# Patient Record
Sex: MALE | Race: WHITE
[De-identification: names, ages, dates, MRNs, and addresses within clinical notes are randomized per-mention and may not be internally consistent; named-entity substitution may affect disease eponyms.]

---

## 2019-11-11 ENCOUNTER — HOSPITAL ENCOUNTER (EMERGENCY)
Dept: HOSPITAL 88 - ER | Age: 34
Discharge: HOME | End: 2019-11-11
Payer: SELF-PAY

## 2019-11-11 VITALS — DIASTOLIC BLOOD PRESSURE: 94 MMHG | SYSTOLIC BLOOD PRESSURE: 113 MMHG

## 2019-11-11 VITALS — HEIGHT: 72 IN | BODY MASS INDEX: 23.7 KG/M2 | WEIGHT: 175 LBS
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
## 2019-11-11 DIAGNOSIS — S61.255A: ICD-10-CM

## 2019-11-11 DIAGNOSIS — S61.451A: Primary | ICD-10-CM

## 2019-11-11 DIAGNOSIS — Z82.49: ICD-10-CM
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
## 2019-11-11 PROCEDURE — 12002 RPR S/N/AX/GEN/TRNK2.6-7.5CM: CPT

## 2019-11-11 PROCEDURE — 90471 IMMUNIZATION ADMIN: CPT

## 2019-11-11 PROCEDURE — 73130 X-RAY EXAM OF HAND: CPT

## 2019-11-11 PROCEDURE — 99284 EMERGENCY DEPT VISIT MOD MDM: CPT

## 2019-11-11 PROCEDURE — 90714 TD VACC NO PRESV 7 YRS+ IM: CPT

## 2019-11-11 NOTE — XMS REPORT
Patient Summary Document

                             Created on: 2019



SAIMA WOLFE

External Reference #: [id number]

: 1985

Sex: Male



Demographics







                          Address                   [address] [place]  [address]

 

                          Home Phone                [phone]

 

                          Preferred Language        Unknown

 

                          Marital Status            Unknown

 

                          Quaker Affiliation     Unknown

 

                          Race                      Unknown

 

                          Ethnic Group              Unknown





Author







                          Author                    [facility]

 

                          [facility]

 

                          Address                   Unknown

 

                          Phone                     Unavailable







Support







                Name            Relationship    Address         Phone

 

                    SILAS GRANDE    PRS                 [address], [place]  [address] [phone]

 

                    NONE,  OTHER        PRS                 [address] [place]  [address]                      [phone]







Care Team Providers







                    Care Team Member Name    Role                Phone

 

                          Unavailable               Unavailable







Payers







             Payer Name    Policy Type    Policy Number    Effective Date    Expiration Date







Problems

This patient has no known problems.



Allergies, Adverse Reactions, Alerts







          Allergy Name    Allergy Type    Status    Severity    Reaction(s)    Onset Date    Inactive 

Date                      Treating Clinician        Comments

 

        No Known Allergies    DA      Active    U               2019 00:00:00                     







Medications

This patient has no known medications.

## 2019-11-11 NOTE — NUR
representative- [place]ley- at [place] animal [employer] notified of dog bite, patient 
is not willing to notify this rn of the location of this dog and is aphrensive 
to give any further information. md cotton and liborio coronado notified

## 2019-11-11 NOTE — DIAGNOSTIC IMAGING REPORT
Exam:  Right hand 3 views



History:  Dog bite



Comparison: None.



Findings: No fracture or malalignment. Joint spaces preserved. No abnormal soft

tissue calcification or soft tissue defect.  



Impression:



No acute osseous abnormality





Signed by: Dr. Andrew Palisch, M.D. on 11/11/2019 6:29 PM

## 2019-11-15 ENCOUNTER — HOSPITAL ENCOUNTER (EMERGENCY)
Dept: HOSPITAL 88 - ER | Age: 34
Discharge: HOME | End: 2019-11-15
Payer: SELF-PAY

## 2019-11-15 VITALS — WEIGHT: 175 LBS | BODY MASS INDEX: 23.7 KG/M2 | HEIGHT: 72 IN

## 2019-11-15 DIAGNOSIS — K21.9: Primary | ICD-10-CM

## 2019-11-15 DIAGNOSIS — R05: ICD-10-CM

## 2019-11-15 PROCEDURE — 71046 X-RAY EXAM CHEST 2 VIEWS: CPT

## 2019-11-15 PROCEDURE — 99283 EMERGENCY DEPT VISIT LOW MDM: CPT
